# Patient Record
Sex: MALE | Race: WHITE | NOT HISPANIC OR LATINO | Employment: FULL TIME | URBAN - METROPOLITAN AREA
[De-identification: names, ages, dates, MRNs, and addresses within clinical notes are randomized per-mention and may not be internally consistent; named-entity substitution may affect disease eponyms.]

---

## 2017-03-24 ENCOUNTER — ALLSCRIPTS OFFICE VISIT (OUTPATIENT)
Dept: OTHER | Facility: OTHER | Age: 27
End: 2017-03-24

## 2018-01-14 NOTE — PROGRESS NOTES
Assessment    1  Encounter for preventive health examination (V70 0) (Z00 00)   2  Balding (704 00) (L65 9)    Plan  Need for influenza vaccination    · Stop: Influenza    Discussion/Summary    HM: Pt declined BW  Apeears to be doing well  Can try otc rogaine for his blading  Chief Complaint  Patient in for annual physical       History of Present Illness  HPI: 32 yo male here for physical  Doing well  Has hx of GERD, took Nexium earleir this year and has been asymptomatic since  Tries to eat healthy and exercise  Notices he is starting to lose hair  Review of Systems    Constitutional: No fever or chills, feels well, no tiredness, no recent weight gain or weight loss  Eyes: No complaints of eye pain, no red eyes, no discharge from eyes, no itchy eyes  ENT: no complaints of earache, no hearing loss, no nosebleeds, no nasal discharge, no sore throat, no hoarseness  Cardiovascular: No complaints of slow heart rate, no fast heart rate, no chest pain, no palpitations, no leg claudication, no lower extremity  Respiratory: No complaints of shortness of breath, no wheezing, no cough, no SOB on exertion, no orthopnea or PND  Gastrointestinal: No complaints of abdominal pain, no constipation, no nausea or vomiting, no diarrhea or bloody stools  Genitourinary: No complaints of dysuria, no incontinence, no hesitancy, no nocturia, no genital lesion, no testicular pain  Musculoskeletal: No complaints of arthralgia, no myalgias, no joint swelling or stiffness, no limb pain or swelling  Integumentary: No complaints of skin rash or skin lesions, no itching, no skin wound, no dry skin  Neurological: No compliants of headache, no confusion, no convulsions, no numbness or tingling, no dizziness or fainting, no limb weakness, no difficulty walking  Psychiatric: Is not suicidal, no sleep disturbances, no anxiety or depression, no change in personality, no emotional problems     Endocrine: No complaints of proptosis, no hot flashes, no muscle weakness, no erectile dysfunction, no deepening of the voice, no feelings of weakness  Hematologic/Lymphatic: No complaints of swollen glands, no swollen glands in the neck, does not bleed easily, no easy bruising  Active Problems    1  Benign essential hypertension (401 1) (I10)   2  Blood pressure elevated (401 9) (I10)   3  GERD without esophagitis (530 81) (K21 9)    Past Medical History    · History of Chest pain on respiration (786 52) (R07 1)   · History of Eosinophilic esophagitis (734 64) (K20 0)   · History of acne (V13 3) (Z87 2)   · History of epistaxis (V12 69) (Z87 898)   · History of esophageal spasm (V12 79) (Z87 19)   · History of sebaceous cyst (V13 3) (Z87 2)   · History of Tinea of perianal region (110 3) (B35 6)    Surgical History    · History of Diagnostic Esophagogastroduodenoscopy   · History of Oral Surgery Tooth Extraction    Family History  Mother    · Family history of malignant neoplasm (V16 9) (Z80 9)  Father    · Family history of Diabetes Mellitus (V18 0)  Paternal Grandmother    · Family history of Cancer  Paternal Grandfather    · Family history of Cancer    Social History    · Always uses seat belt   · Denied: History of Drug use   · Never A Smoker   · Occasional alcohol use    Current Meds   1  No Reported Medications Recorded    Allergies    1  Amoxicillin TABS    2  No Known Environmental Allergies   3  No Known Food Allergies   4  No Known Latex Allergies    Vitals   Recorded: [id number] 02:34PM   Temperature 96 5 F   Heart Rate 74   Respiration 18   Systolic 470   Diastolic 64   Height 5 ft 7 in   Weight 156 lb 8 oz   BMI Calculated 24 51   BSA Calculated 1 82   O2 Saturation 99     Physical Exam    Constitutional   General appearance: No acute distress, well appearing and well nourished  Head and Face   Head and face: Normal     Eyes   Conjunctiva and lids: No erythema, swelling or discharge      Ears, Nose, Mouth, and Throat External inspection of ears and nose: Normal     Otoscopic examination: Tympanic membranes translucent with normal light reflex  Canals patent without erythema  Nasal mucosa, septum, and turbinates: Normal without edema or erythema  Lips, teeth, and gums: Normal, good dentition  Oropharynx: Normal with no erythema, edema, exudate or lesions  Neck   Neck: Supple, symmetric, trachea midline, no masses  Pulmonary   Respiratory effort: No increased work of breathing or signs of respiratory distress  Auscultation of lungs: Clear to auscultation  Cardiovascular   Auscultation of heart: Normal rate and rhythm, normal S1 and S2, no murmurs  Abdomen   Abdomen: Non-tender, no masses  Liver and spleen: No hepatomegaly or splenomegaly  Skin   Skin and subcutaneous tissue: Normal without rashes or lesions  Neurologic   Cranial nerves: Cranial nerves 2-12 intact  Psychiatric   Orientation to person, place and time: Normal        Results/Data  PHQ-2 Adult Depression Screening 24Mar2017 02:48PM User, Michaela     Test Name Result Flag Reference   PHQ-2 Adult Depression Score 0     Over the last two weeks, how often have you been bothered by any of the following problems? Little interest or pleasure in doing things: Not at all - 0  Feeling down, depressed, or hopeless: Not at all - 0   PHQ-2 Adult Depression Screening Negative         Procedure    Procedure:   Results: 20/25 in both eyes with corrective device, 20/30 in the right eye with corrective device, 20/30 in the left eye with corrective device      Attending Note  Attending Note: Attending Note: I did not interview and examine the patient, I supervised the Resident and I agree with the Resident management plan as it was presented to me  Level of Participation: I was present in clinic, but did not examine the patient  I agree with the Resident's note        Signatures   Electronically signed by : Tesha Nunez DO; Mar 25 2017  3:43PM EST (Author)    Electronically signed by :  MANAS Moscoso ; Mar 25 2017  5:23PM EST                       (Co-author)

## 2018-01-22 VITALS
HEIGHT: 67 IN | BODY MASS INDEX: 24.56 KG/M2 | HEART RATE: 74 BPM | OXYGEN SATURATION: 99 % | DIASTOLIC BLOOD PRESSURE: 64 MMHG | SYSTOLIC BLOOD PRESSURE: 136 MMHG | WEIGHT: 156.5 LBS | RESPIRATION RATE: 18 BRPM | TEMPERATURE: 96.5 F

## 2018-02-01 ENCOUNTER — TELEPHONE (OUTPATIENT)
Dept: FAMILY MEDICINE CLINIC | Facility: CLINIC | Age: 28
End: 2018-02-01

## 2018-02-01 NOTE — TELEPHONE ENCOUNTER
PT HAS APPT WITH DR Saint Cherry ON 2/9 TO HAVE FORMS FILLED OUT FOR EMPLOYEMENT HOWEVER, THEY ALSO ARE REQUESTING SONME BW SO HE IS ASKING TO HAVE THE ORDER SO HE CAN HAVE THE BW PRIOR TO HIS APPT     PLEASE CALL PT WITH ANY QUESTIONS

## 2018-02-06 NOTE — TELEPHONE ENCOUNTER
Dr Susan Lo ON Friday  HE IS LOOKING FOR A BW ORDER SO HE CAN HAVE BW DONE BEFORE APPT  HE WOULD LIKE CALL WHEN READY

## 2018-02-09 ENCOUNTER — OFFICE VISIT (OUTPATIENT)
Dept: FAMILY MEDICINE CLINIC | Facility: CLINIC | Age: 28
End: 2018-02-09
Payer: COMMERCIAL

## 2018-02-09 DIAGNOSIS — Z13.1 SCREENING FOR DIABETES MELLITUS: ICD-10-CM

## 2018-02-09 DIAGNOSIS — I10 ESSENTIAL HYPERTENSION: Primary | ICD-10-CM

## 2018-02-09 DIAGNOSIS — Z13.6 SCREENING FOR CARDIOVASCULAR CONDITION: ICD-10-CM

## 2018-02-09 DIAGNOSIS — Z23 NEED FOR INFLUENZA VACCINATION: ICD-10-CM

## 2018-02-09 PROCEDURE — 90471 IMMUNIZATION ADMIN: CPT | Performed by: FAMILY MEDICINE

## 2018-02-09 PROCEDURE — 99213 OFFICE O/P EST LOW 20 MIN: CPT | Performed by: FAMILY MEDICINE

## 2018-02-09 PROCEDURE — 90658 IIV3 VACCINE SPLT 0.5 ML IM: CPT | Performed by: FAMILY MEDICINE

## 2018-02-11 VITALS — DIASTOLIC BLOOD PRESSURE: 90 MMHG | SYSTOLIC BLOOD PRESSURE: 140 MMHG

## 2018-02-11 PROBLEM — Z23 NEED FOR INFLUENZA VACCINATION: Status: ACTIVE | Noted: 2018-02-11

## 2018-02-11 PROBLEM — Z13.1 SCREENING FOR DIABETES MELLITUS: Status: ACTIVE | Noted: 2018-02-11

## 2018-02-11 PROBLEM — Z13.6 SCREENING FOR CARDIOVASCULAR CONDITION: Status: ACTIVE | Noted: 2018-02-11

## 2018-02-11 PROBLEM — I10 ESSENTIAL HYPERTENSION: Status: ACTIVE | Noted: 2018-02-11

## 2018-02-11 NOTE — PROGRESS NOTES
Assessment/Plan:    Essential hypertension  Blood pressure elevated 140/90  Patient declines medication therapy at this time  He states he believes his blood pressure is normal at home  Advised patient to purchase blood pressure cuff and take readings at home  Advised that is readings are greater than 120/80 on multiple occassions he should present to clinic for re-evaluation  Patient agrees with the above management plan  Diagnoses and all orders for this visit:    Essential hypertension  -     Lipid panel; Future  -     Comprehensive metabolic panel; Future    Screening for cardiovascular condition  -     Lipid panel; Future  -     Comprehensive metabolic panel; Future    Screening for diabetes mellitus  -     Hemoglobin A1c; Future    Need for influenza vaccination  -     FLU VACCINE QUADRIVALENT GREATER THAN OR EQUAL TO 2YO PRESERVATIVE FREE IM          Subjective:      Patient ID: Darcy Nelson is a 28 y o  male  19-y/o male, no pertinent PMHx, presents to clinic today for evaluation for routine lab work  Patient states that insurance requires that he have routine lab work  Patient has had an annual physical exam visit within the past year  Patient has had elevated blood pressure with previous office visit  Patient states that he believes this is white coat syndrome and that his blood pressure is normal at home  Patient does not take his blood pressure at home and states that he subjectively feels that his blood pressure is lower  Patient declines all vitals except blood pressure  The following portions of the patient's history were reviewed and updated as appropriate: allergies, current medications, past family history, past medical history, past social history, past surgical history and problem list     Review of Systems   Constitutional: Negative  HENT: Negative  Eyes: Negative  Respiratory: Negative  Cardiovascular: Negative  Gastrointestinal: Negative      Endocrine: Negative  Genitourinary: Negative  Musculoskeletal: Negative  Skin: Negative  Allergic/Immunologic: Negative  Neurological: Negative  Hematological: Negative  Psychiatric/Behavioral: Negative  Objective:    Vitals:    02/11/18 1900   BP: 140/90        Physical Exam   Constitutional: He is oriented to person, place, and time  He appears well-developed and well-nourished  No distress  HENT:   Head: Normocephalic and atraumatic  Eyes: Conjunctivae and EOM are normal  Pupils are equal, round, and reactive to light  No scleral icterus  Neck: Normal range of motion  Neck supple  Cardiovascular: Normal rate, regular rhythm and intact distal pulses  Exam reveals no gallop and no friction rub  No murmur heard  Pulmonary/Chest: Effort normal  No respiratory distress  He has no wheezes  He has no rales  He exhibits no tenderness  Neurological: He is alert and oriented to person, place, and time  Skin: He is not diaphoretic

## 2018-02-12 NOTE — ASSESSMENT & PLAN NOTE
Blood pressure elevated 140/90  Patient declines medication therapy at this time  He states he believes his blood pressure is normal at home  Advised patient to purchase blood pressure cuff and take readings at home  Advised that is readings are greater than 120/80 on multiple occassions he should present to clinic for re-evaluation  Patient agrees with the above management plan

## 2021-10-05 ENCOUNTER — TELEPHONE (OUTPATIENT)
Dept: FAMILY MEDICINE CLINIC | Facility: CLINIC | Age: 31
End: 2021-10-05